# Patient Record
Sex: FEMALE | Employment: FULL TIME | URBAN - METROPOLITAN AREA
[De-identification: names, ages, dates, MRNs, and addresses within clinical notes are randomized per-mention and may not be internally consistent; named-entity substitution may affect disease eponyms.]

---

## 2018-05-01 ENCOUNTER — OFFICE VISIT (OUTPATIENT)
Dept: FAMILY MEDICINE CLINIC | Facility: CLINIC | Age: 38
End: 2018-05-01

## 2018-05-01 VITALS
SYSTOLIC BLOOD PRESSURE: 123 MMHG | DIASTOLIC BLOOD PRESSURE: 78 MMHG | BODY MASS INDEX: 26.67 KG/M2 | HEART RATE: 64 BPM | WEIGHT: 156.19 LBS | HEIGHT: 64 IN

## 2018-05-01 DIAGNOSIS — L30.9 DERMATITIS: Primary | ICD-10-CM

## 2018-05-01 DIAGNOSIS — J02.9 SORE THROAT: ICD-10-CM

## 2018-05-01 PROCEDURE — 99213 OFFICE O/P EST LOW 20 MIN: CPT | Performed by: FAMILY MEDICINE

## 2018-05-01 PROCEDURE — 99212 OFFICE O/P EST SF 10 MIN: CPT | Performed by: FAMILY MEDICINE

## 2018-05-01 RX ORDER — LEVOCETIRIZINE DIHYDROCHLORIDE 5 MG/1
5 TABLET, FILM COATED ORAL EVERY EVENING
Qty: 30 TABLET | Refills: 0 | Status: SHIPPED | OUTPATIENT
Start: 2018-05-01

## 2018-05-01 RX ORDER — FLUTICASONE PROPIONATE 50 MCG
2 SPRAY, SUSPENSION (ML) NASAL DAILY
Qty: 1 BOTTLE | Refills: 0 | Status: SHIPPED | OUTPATIENT
Start: 2018-05-01 | End: 2019-04-26

## 2018-05-01 RX ORDER — CLOTRIMAZOLE AND BETAMETHASONE DIPROPIONATE 10; .64 MG/G; MG/G
1 CREAM TOPICAL 2 TIMES DAILY
Qty: 45 G | Refills: 2 | Status: SHIPPED | OUTPATIENT
Start: 2018-05-01

## 2018-05-01 NOTE — PROGRESS NOTES
Kaleigh Craft is a 42year old female. Patient presents with:  Derm Problem    HPI:   Reports rash on left leg - since January. Thought at first poison ivy but not getting better. Using otc Gold Bond lotion and otc cortisone.    Reports recently stopped plan.      Nanci Oswald MD  5/1/2018  9:58 AM

## 2018-05-29 ENCOUNTER — HOSPITAL ENCOUNTER (OUTPATIENT)
Dept: MAMMOGRAPHY | Age: 38
Discharge: HOME | End: 2018-05-29
Attending: FAMILY MEDICINE
Payer: COMMERCIAL

## 2018-05-29 ENCOUNTER — OFFICE VISIT (OUTPATIENT)
Dept: FAMILY MEDICINE CLINIC | Facility: CLINIC | Age: 38
End: 2018-05-29

## 2018-05-29 VITALS
HEART RATE: 54 BPM | SYSTOLIC BLOOD PRESSURE: 114 MMHG | DIASTOLIC BLOOD PRESSURE: 64 MMHG | HEIGHT: 64 IN | BODY MASS INDEX: 26.46 KG/M2 | WEIGHT: 155 LBS

## 2018-05-29 DIAGNOSIS — Z12.31 SCREENING MAMMOGRAM, ENCOUNTER FOR: ICD-10-CM

## 2018-05-29 DIAGNOSIS — Z01.419 ENCOUNTER FOR WELL WOMAN EXAM WITH ROUTINE GYNECOLOGICAL EXAM: Primary | ICD-10-CM

## 2018-05-29 DIAGNOSIS — Z01.419 ENCOUNTER FOR WELL WOMAN EXAM WITH ROUTINE GYNECOLOGICAL EXAM: ICD-10-CM

## 2018-05-29 PROCEDURE — 77067 SCR MAMMO BI INCL CAD: CPT | Performed by: FAMILY MEDICINE

## 2018-05-29 PROCEDURE — 99396 PREV VISIT EST AGE 40-64: CPT | Performed by: FAMILY MEDICINE

## 2018-05-29 NOTE — PROGRESS NOTES
HPI:   Ana María Hill is a 34year old female who presents for a complete physical exam.   Patient's last menstrual period was 05/15/2018. Reports prior pap over 5 years ago - they were usually normal.   Had mammogram years ago and biopsy - benign.    Lisa Millard anxiety  HEMATOLOGIC: denies hx of anemia  ENDOCRINE: denies thyroid history  ALL/ASTHMA: denies hx of allergy or asthma    EXAM:   /64   Pulse 54   Ht 5' 4\" (1.626 m)   Wt 155 lb (70.3 kg)   LMP 05/15/2018   BMI 26.61 kg/m²   Body mass index is 26.

## 2018-05-30 NOTE — PROGRESS NOTES
Radiologist wants old films to compare if able to get them. If cannot get them, will do additional views.

## 2018-05-31 ENCOUNTER — TELEPHONE (OUTPATIENT)
Dept: FAMILY MEDICINE CLINIC | Facility: CLINIC | Age: 38
End: 2018-05-31

## 2018-05-31 NOTE — TELEPHONE ENCOUNTER
----- Message from Reggie Ortiz MD sent at 5/30/2018  6:08 PM CDT -----  Normal pap.  Repeat in 5 years but should still having yearly physical.

## 2018-06-01 ENCOUNTER — TELEPHONE (OUTPATIENT)
Dept: OTHER | Age: 38
End: 2018-06-01

## 2018-06-01 DIAGNOSIS — R92.8 ABNORMAL MAMMOGRAM: Primary | ICD-10-CM

## 2018-06-01 NOTE — TELEPHONE ENCOUNTER
Spoke with patient (identified name and ) ,advised Dr Clarice Chaney note and states that she cannot get the old films, will send the message to Dr Chance Dickerson to order the additional views and will call her back.      Dr ORNELAS=pended mammo , please make sure this is the

## 2018-06-02 ENCOUNTER — LAB ENCOUNTER (OUTPATIENT)
Dept: LAB | Age: 38
End: 2018-06-02
Attending: FAMILY MEDICINE
Payer: COMMERCIAL

## 2018-06-02 DIAGNOSIS — Z01.419 ENCOUNTER FOR WELL WOMAN EXAM WITH ROUTINE GYNECOLOGICAL EXAM: ICD-10-CM

## 2018-06-02 PROCEDURE — 82607 VITAMIN B-12: CPT

## 2018-06-02 PROCEDURE — 85025 COMPLETE CBC W/AUTO DIFF WBC: CPT

## 2018-06-02 PROCEDURE — 36415 COLL VENOUS BLD VENIPUNCTURE: CPT

## 2018-06-02 PROCEDURE — 84443 ASSAY THYROID STIM HORMONE: CPT

## 2018-06-02 PROCEDURE — 80061 LIPID PANEL: CPT

## 2018-06-02 PROCEDURE — 80053 COMPREHEN METABOLIC PANEL: CPT

## 2018-06-02 PROCEDURE — 82306 VITAMIN D 25 HYDROXY: CPT

## 2018-06-02 NOTE — TELEPHONE ENCOUNTER
Advised patient of Dr. Mini Houston note and number provided to scheduling. Patient verbalized understanding.

## 2018-06-06 ENCOUNTER — HOSPITAL ENCOUNTER (OUTPATIENT)
Dept: ULTRASOUND IMAGING | Facility: HOSPITAL | Age: 38
Discharge: HOME | End: 2018-06-06
Attending: FAMILY MEDICINE
Payer: COMMERCIAL

## 2018-06-06 ENCOUNTER — HOSPITAL ENCOUNTER (OUTPATIENT)
Dept: MAMMOGRAPHY | Facility: HOSPITAL | Age: 38
Discharge: HOME | End: 2018-06-06
Attending: FAMILY MEDICINE
Payer: COMMERCIAL

## 2018-06-06 DIAGNOSIS — R92.8 ABNORMAL MAMMOGRAM: ICD-10-CM

## 2018-06-06 PROCEDURE — 77061 BREAST TOMOSYNTHESIS UNI: CPT | Performed by: FAMILY MEDICINE

## 2018-06-06 PROCEDURE — 77065 DX MAMMO INCL CAD UNI: CPT | Performed by: FAMILY MEDICINE

## 2018-06-06 PROCEDURE — 76642 ULTRASOUND BREAST LIMITED: CPT | Performed by: FAMILY MEDICINE

## 2018-06-07 NOTE — PROGRESS NOTES
Patient needs SIX MONTH FOLLOW-UP DIAGNOSTIC MAMMOGRAM AND ULTRASOUND: RIGHT BREAST. There is a small mass but per pt was previous biopsied and benign so will monitor again now.

## 2018-06-08 ENCOUNTER — TELEPHONE (OUTPATIENT)
Dept: OTHER | Age: 38
End: 2018-06-08

## 2018-06-08 DIAGNOSIS — R92.8 ABNORMAL MAMMOGRAM: Primary | ICD-10-CM

## 2018-06-08 NOTE — TELEPHONE ENCOUNTER
LMTCB please transfer to triage. Thanks    Notes recorded by Jesi Gomez MD on 6/7/2018 at 2:33 PM CDT  Patient needs SIX MONTH FOLLOW-UP DIAGNOSTIC MAMMOGRAM AND ULTRASOUND: RIGHT BREAST.  There is a small mass but per pt was previous biopsied and be

## 2018-06-09 NOTE — TELEPHONE ENCOUNTER
Patient calling back, advised Dr Juwan Harris note and verbalized understanding. Notes recorded by Anibal Crenshaw MD on 6/7/2018 at 2:33 PM CDT  Patient needs SIX MONTH FOLLOW-UP DIAGNOSTIC MAMMOGRAM AND ULTRASOUND: RIGHT BREAST.  There is a small mass bu

## (undated) NOTE — LETTER
May 31, 2018     [facility]  [address]      Dear Andrews Bold:    Below are the results from your recent visit: Normal pap.  Repeat in 5 years but should still having yearly physical    Resulted Orders   HPV HIGH RISK 05/29/2018 07:48 PM                                 [place] Screen:          Patricia Fulton                                                          Specimen:     ThinPrep Image

## (undated) NOTE — LETTER
June 11, 2018     Cherry Labella  [address], [address]  [place] [id number]      Dear Victor M Rosen:    Below are the results from your recent visit: Tests are all normal.     Resulted Orders   COMP METABOLIC PANEL (14)   Result Value Ref Range    Glucos MPV 8.6 7.4 - 10.3 fL    Neutrophil % 56 %    Lymphocyte % 23 %    Monocyte % 14 %    Eosinophil % 6 %    Basophil % 1 %    Neutrophil Absolute 2.5 1.8 - 7.7 K/UL    Lymphocyte Absolute 1.1 1.0 - 4.0 K/UL    Monocyte Absolute 0.6 0.0 - 1.0 K/UL    Eosinop